# Patient Record
Sex: FEMALE | Race: WHITE
[De-identification: names, ages, dates, MRNs, and addresses within clinical notes are randomized per-mention and may not be internally consistent; named-entity substitution may affect disease eponyms.]

---

## 2019-07-27 ENCOUNTER — HOSPITAL ENCOUNTER (OUTPATIENT)
Age: 69
End: 2019-07-27
Payer: COMMERCIAL

## 2019-07-27 DIAGNOSIS — R73.09: ICD-10-CM

## 2019-07-27 DIAGNOSIS — E55.9: ICD-10-CM

## 2019-07-27 DIAGNOSIS — I10: ICD-10-CM

## 2019-07-27 DIAGNOSIS — E78.00: ICD-10-CM

## 2019-07-27 DIAGNOSIS — Z00.00: Primary | ICD-10-CM

## 2019-07-27 LAB
ALANINE AMINOTRANSFER ALT/SGPT: 35 U/L (ref 13–56)
ALBUMIN SERPL-MCNC: 3.9 G/DL (ref 3.2–5)
ALKALINE PHOSPHATASE: 73 U/L (ref 45–117)
ANION GAP: 8 (ref 5–15)
AST(SGOT): 29 U/L (ref 15–37)
BUN SERPL-MCNC: 21 MG/DL (ref 7–18)
BUN/CREAT RATIO: 25.2 RATIO (ref 10–20)
CALCIUM SERPL-MCNC: 8.8 MG/DL (ref 8.5–10.1)
CARBON DIOXIDE: 25 MMOL/L (ref 21–32)
CHLORIDE: 106 MMOL/L (ref 98–107)
CHOLEST SERPL-MCNC: 146 MG/DL
EST GLOM FILT RATE - AFR AMER: 87 ML/MIN (ref 60–?)
GLOBULIN: 3.9 G/DL (ref 2.2–4.2)
GLUCOSE: 96 MG/DL (ref 74–106)
MAGNESIUM: 1.9 MG/DL (ref 1.6–2.6)
POTASSIUM: 3.5 MMOL/L (ref 3.5–5.1)
TRIGLYCERIDES: 139 MG/DL
VLDLC SERPL-MCNC: 28 MG/DL (ref 5–40)

## 2019-07-27 PROCEDURE — 83036 HEMOGLOBIN GLYCOSYLATED A1C: CPT

## 2019-07-27 PROCEDURE — 83735 ASSAY OF MAGNESIUM: CPT

## 2019-07-27 PROCEDURE — 82306 VITAMIN D 25 HYDROXY: CPT

## 2019-07-27 PROCEDURE — 80061 LIPID PANEL: CPT

## 2019-07-27 PROCEDURE — 36415 COLL VENOUS BLD VENIPUNCTURE: CPT

## 2019-07-27 PROCEDURE — 84443 ASSAY THYROID STIM HORMONE: CPT

## 2019-07-27 PROCEDURE — 80053 COMPREHEN METABOLIC PANEL: CPT

## 2019-07-29 LAB — VITAMIN D,25 HYDROXY: 32.7 NG/ML (ref 29.95–100.01)

## 2023-12-27 ENCOUNTER — EVALUATION (OUTPATIENT)
Dept: PHYSICAL THERAPY | Facility: CLINIC | Age: 73
End: 2023-12-27
Payer: MEDICARE

## 2023-12-27 DIAGNOSIS — Z98.890 S/P LEFT ROTATOR CUFF REPAIR: ICD-10-CM

## 2023-12-27 DIAGNOSIS — M25.512 LEFT SHOULDER PAIN: Primary | ICD-10-CM

## 2023-12-27 PROCEDURE — 97161 PT EVAL LOW COMPLEX 20 MIN: CPT | Mod: GP

## 2023-12-27 ASSESSMENT — ENCOUNTER SYMPTOMS
DEPRESSION: 0
LOSS OF SENSATION IN FEET: 0
OCCASIONAL FEELINGS OF UNSTEADINESS: 0

## 2023-12-27 ASSESSMENT — ACTIVITIES OF DAILY LIVING (ADL): EFFECT OF PAIN ON DAILY ACTIVITIES: SEE GOALS

## 2023-12-27 ASSESSMENT — PAIN - FUNCTIONAL ASSESSMENT: PAIN_FUNCTIONAL_ASSESSMENT: 0-10

## 2023-12-27 ASSESSMENT — PAIN SCALES - GENERAL: PAINLEVEL_OUTOF10: 8

## 2023-12-27 NOTE — Clinical Note
December 27, 2023    Yung Hylton MD  [address] [address] [place] [address]    Patient: Apolonia Badillo   YOB: 1950   Date of Visit: 12/27/2023       Dear Yung Hylton Md  [address] [facility] [address] [address]    The attached plan of care is being sent to you because your patient’s medical reimbursement requires that you certify the plan of care. Your signature is required to allow uninterrupted insurance coverage.      You may indicate your approval by signing below and faxing this form back to us at Dept Fax: [phone].    Please call Dept: [phone] with any questions or concerns.    Thank you for this referral,        Shahbaz Feliciano, PT  [address] [address]    Payer: Payor: [employer] / Plan: [facility] MEDICARE ADVANTAGE / Product Type: *No Product type* /                                                                         Date:     Dear Shahbaz Feliciano PT,     Re: Ms. Apolonia Badillo, MRN:[id number]    I certify that I have reviewed the attached plan of care and it is medically necessary for Ms. Apolonia Badillo (1950) who is under my care.          ______________________________________                    _________________  Provider name and credentials                                           Date and time                                                                                           Plan of Care 12/27/23   Effective from: 12/27/2023  Effective to: 3/26/2024    Plan ID: [id number]            Participants as of Finalize on 12/27/2023    Name Type Comments Contact Info    Yung Hylton MD Referring Provider  [phone]    Shahbaz Feliciano PT Physical Therapist  [phone]       Last Plan Note     Author: Shahbaz Feliciano PT Status: Signed Last edited: 12/27/2023  8:30 AM       Physical Therapy Evaluation and Treatment      Patient Name: Apolonia Badillo  MRN:  [id number]  Today's Date: 12/27/2023  Time Calculation  Start Time: 0830  Stop Time: 0915  Time Calculation (min): 45 min      Assessment:  Rehab Prognosis: Good  S/P L RTCR/bicpes tenodesis  The physical therapist of record is the therapist who assumes primary responsibility for patient management and as such is held accountable for the coordination, continuation, and progression of the POC.  This patient's care and PT of record will be transferred from Shahbaz Feliciano PT to  DEDRICK ALFONSOT effective as of  12/27/23.  There is a 35-40$ copay.  Plan:  Treatment/Interventions: Cryotherapy, Education/ Instruction, Electrical stimulation, Hot pack, Manual therapy, Self care/ home management, Therapeutic exercises (IASTM/cupping)  PT Plan: Skilled PT  PT Frequency: 2 times per week  Duration: 6wks  Onset Date: 12/21/23  Certification Period Start Date: 12/27/23  Certification Period End Date: 03/26/24  Rehab Potential: Good  Plan of Care Agreement: Patient    Current Problem:   1. Left shoulder pain        2. S/P left rotator cuff repair  Referral to Physical Therapy          Subjective    General:  General  Reason for Referral: S/P L shldr RTCR/biceps tenodesis (scope)  Referred By: Warner  Precautions:  Precautions  STEADI Fall Risk Score (The score of 4 or more indicates an increased risk of falling): 0  Braces Applied: L post surg pillow/s;ing worn to eval  Precautions Comment: rehab to [facility] in chart    Pain:  Pain Assessment  Pain Assessment: 0-10  Pain Score: 8  Pain Location: Shoulder  Pain Orientation: Left  Effect of Pain on Daily Activities: see goals    Prior Level of Function:  Prior Function Per Pt/Caregiver Report  Vocational:  (off work-factory/assembly)  Hand Dominance: Left    Objective     Outcome Measures:  Other Measures  Other Outcome Measures: 75% QDI     Treatments:eval only-time  Continue with L shldr S/P surgical rehab per protocol (the patient agreed to confirm whether or not there was  "subscapularis repair done with the surgery)    OP EDUCATION:  Outpatient Education  Individual(s) Educated: Patient  Patient/Caregiver Demonstrated Understanding: yes  Plan of Care Discussed and Agreed Upon: yes  Discussed scheduling with 35-40$ copay  Goals:  Active       PT Problem       PT Goal 1       Start:  12/27/23    Expected End:  03/26/24       1. Independent HEP to allow for 50% reduction in max ADL C/C sx ( 8/10) 3-4wks  2. 0/10 night time sx to allow for uninterrupted sleep x 1wk ( 7/7) 3-4wks  3. Survey score improvement from 75% to 50% (QDI) 4-6wks  4. ROM increase to allow for ADL reaching (per Dr protocol) 4-6wks   5. Strength increase to allow for improved ADL object handling (per Dr protocol) 4-6wks          PT Goal 2       Start:  12/27/23    Expected End:  03/26/24       1. \"I want to get back to [employer] and work\"             Shoulder        Shoulder Observation  Shoulder Observation Comment: eneterd with L post surg pillow/sling in place        Shoulder AROM WFL unless documented below  R Shoulder flexion: (180°): 140  L Shoulder flexion: (180°): NT (per protocol)  Shoulder PROM WFL unless documented below  R shoulder flexion: (180°): 165  L shoulder flexion: (180°): 90  R shoulder ER: (90°): 90  L shoulder ER: (90°): 20  R shoulder IR: (70°): 90    Shoulder Strength WFL unless documented below  R shoulder abduction: (5/5): 5/5  L shoulder abduction: (5/5): NT  R shoulder ER: (5/5): 5/5  L shoulder ER: (5/5): NT  R shoulder IR: (5/5) : 5/5  L shoulder IR: (5/5): NT  R elb flex/ext 5/5; L NT         Current Participants as of 12/27/2023    Name Type Comments Contact Info    Yung Hylton MD Referring Provider  [phone]    Signature pending    Shahbaz Feliciano, PT Physical Therapist  [phone]          "

## 2023-12-27 NOTE — PROGRESS NOTES
Physical Therapy Evaluation and Treatment      Patient Name: Apolonia Badillo  MRN: [id number]  Today's Date: 12/27/2023  Time Calculation  Start Time: 0830  Stop Time: 0915  Time Calculation (min): 45 min      Assessment:  Rehab Prognosis: Good  S/P L RTCR/bicpes tenodesis  The physical therapist of record is the therapist who assumes primary responsibility for patient management and as such is held accountable for the coordination, continuation, and progression of the POC.  This patient's care and PT of record will be transferred from Shahbaz Feliciano PT to  DEDRICK ALFONSOT effective as of  12/27/23.  There is a 35-40$ copay.  Plan:  Treatment/Interventions: Cryotherapy, Education/ Instruction, Electrical stimulation, Hot pack, Manual therapy, Self care/ home management, Therapeutic exercises (IASTM/cupping)  PT Plan: Skilled PT  PT Frequency: 2 times per week  Duration: 6wks  Onset Date: 12/21/23  Certification Period Start Date: 12/27/23  Certification Period End Date: 03/26/24  Rehab Potential: Good  Plan of Care Agreement: Patient    Current Problem:   1. Left shoulder pain        2. S/P left rotator cuff repair  Referral to Physical Therapy          Subjective    General:  General  Reason for Referral: S/P L shldr RTCR/biceps tenodesis (scope)  Referred By: Warner  Precautions:  Precautions  STEADI Fall Risk Score (The score of 4 or more indicates an increased risk of falling): 0  Braces Applied: L post surg pillow/s;ing worn to eval  Precautions Comment: rehab to [facility] in chart    Pain:  Pain Assessment  Pain Assessment: 0-10  Pain Score: 8  Pain Location: Shoulder  Pain Orientation: Left  Effect of Pain on Daily Activities: see goals    Prior Level of Function:  Prior Function Per Pt/Caregiver Report  Vocational:  (off work-factory/assembly)  Hand Dominance: Left    Objective     Outcome Measures:  Other Measures  Other Outcome Measures: 75% QDI     Treatments:eval only-time  Continue with L shldr S/P  "surgical rehab per protocol (the patient agreed to confirm whether or not there was subscapularis repair done with the surgery-per patient VMM the subscapularis was not repaired during surgery so subscap protocol limitations do not apply)    OP EDUCATION:  Outpatient Education  Individual(s) Educated: Patient  Patient/Caregiver Demonstrated Understanding: yes  Plan of Care Discussed and Agreed Upon: yes  Discussed scheduling with 35-40$ copay  Goals:  Active       PT Problem       PT Goal 1       Start:  12/27/23    Expected End:  03/26/24       1. Independent HEP to allow for 50% reduction in max ADL C/C sx ( 8/10) 3-4wks  2. 0/10 night time sx to allow for uninterrupted sleep x 1wk ( 7/7) 3-4wks  3. Survey score improvement from 75% to 50% (QDI) 4-6wks  4. ROM increase to allow for ADL reaching (per Dr protocol) 4-6wks   5. Strength increase to allow for improved ADL object handling (per Dr protocol) 4-6wks          PT Goal 2       Start:  12/27/23    Expected End:  03/26/24       1. \"I want to get back to [employer] and work\"             Shoulder        Shoulder Observation  Shoulder Observation Comment: eneterd with L post surg pillow/sling in place        Shoulder AROM WFL unless documented below  R Shoulder flexion: (180°): 140  L Shoulder flexion: (180°): NT (per protocol)  Shoulder PROM WFL unless documented below  R shoulder flexion: (180°): 165  L shoulder flexion: (180°): 90  R shoulder ER: (90°): 90  L shoulder ER: (90°): 20  R shoulder IR: (70°): 90    Shoulder Strength WFL unless documented below  R shoulder abduction: (5/5): 5/5  L shoulder abduction: (5/5): NT  R shoulder ER: (5/5): 5/5  L shoulder ER: (5/5): NT  R shoulder IR: (5/5) : 5/5  L shoulder IR: (5/5): NT  R elb flex/ext 5/5; L NT  "

## 2024-01-15 ENCOUNTER — TREATMENT (OUTPATIENT)
Dept: PHYSICAL THERAPY | Facility: CLINIC | Age: 74
End: 2024-01-15
Payer: MEDICARE

## 2024-01-15 DIAGNOSIS — Z98.890 S/P LEFT ROTATOR CUFF REPAIR: ICD-10-CM

## 2024-01-15 DIAGNOSIS — M25.512 LEFT SHOULDER PAIN: ICD-10-CM

## 2024-01-15 PROCEDURE — 97110 THERAPEUTIC EXERCISES: CPT | Mod: GP,CQ

## 2024-01-15 PROCEDURE — 97140 MANUAL THERAPY 1/> REGIONS: CPT | Mod: GP,CQ

## 2024-01-15 ASSESSMENT — PAIN - FUNCTIONAL ASSESSMENT: PAIN_FUNCTIONAL_ASSESSMENT: 0-10

## 2024-01-15 ASSESSMENT — PAIN DESCRIPTION - DESCRIPTORS: DESCRIPTORS: DULL;ACHING

## 2024-01-15 ASSESSMENT — PAIN SCALES - GENERAL: PAINLEVEL_OUTOF10: 3

## 2024-01-15 NOTE — PROGRESS NOTES
"Physical Therapy Treatment    Patient Name: Apolonia Badillo  MRN: [id number]  Today's Date: 1/15/2024  Time Calculation  Start Time: 1446  Stop Time: 1530  Time Calculation (min): 44 min      Assessment:Patient identified by name and date of birth.   Added scap retractions and shoulder rolls this date.   Mild end range tightness passively with flexion.   Added new exercises to HEP with correct technique demonstrated and patient verbalizing understanding of instruction. (see below)         Plan:  Plan to follow MD protocol for progression. JA    OP PT Plan  Treatment/Interventions: Cryotherapy, Education/ Instruction, Electrical stimulation, Hot pack, Manual therapy, Self care/ home management, Therapeutic exercises (IASTM/cupping)  PT Plan: Skilled PT  PT Frequency: 2 times per week  Duration: 6wks  Onset Date: 12/21/23  Certification Period Start Date: 12/27/23  Certification Period End Date: 03/26/24  Rehab Potential: Good  Plan of Care Agreement: Patient    Current Problem  Problem List Items Addressed This Visit             ICD-10-CM    Left shoulder pain M25.512     Other Visit Diagnoses         Codes    S/P left rotator cuff repair     Z98.890            Subjective  No HEP  given at eval. Patient reports has F/U with MD on 2/05/24.     Precautions  Precautions  Braces Applied: L post surg pillows worn to eval  Precautions Comment: rehab to [facility] in chart    Pain  Pain Assessment: 0-10  Pain Score: 3  Pain Location: Shoulder (shoulder and upper arm)  Pain Orientation: Left  Pain Descriptors: Dull, Aching  Pain Frequency: Intermittent    Treatments:  Continue with L shldr S/P surgical rehab per protocol (the patient agreed to confirm whether or not there was subscapularis repair done with the surgery-per patient [facility] the subscapularis was not repaired during surgery so subscap protocol limitations do not apply)    Therex  Scap retractions 2 x 10 3\" hold (N)   Shoulder rolls Fwd/Bwd 2 x 10 (N)   Pendulums x " "10 (N)     Manual: PROM all planes following protocol guidelines.     OP EDUCATION:  1/15/24: Added shoulder rolls and scap retractions but patient declined handout.      Outpatient Education  Individual(s) Educated: Patient  Patient/Caregiver Demonstrated Understanding: yes  Plan of Care Discussed and Agreed Upon: yes  Discussed scheduling with 35-40$ copay    Goals:  Active       PT Problem       PT Goal 1       Start:  12/27/23    Expected End:  03/26/24       1. Independent HEP to allow for 50% reduction in max ADL C/C sx ( 8/10) 3-4wks  2. 0/10 night time sx to allow for uninterrupted sleep x 1wk ( 7/7) 3-4wks  3. Survey score improvement from 75% to 50% (QDI) 4-6wks  4. ROM increase to allow for ADL reaching (per Dr protocol) 4-6wks   5. Strength increase to allow for improved ADL object handling (per Dr protocol) 4-6wks          PT Goal 2       Start:  12/27/23    Expected End:  03/26/24       1. \"I want to get back to [employer] and work\"            "

## 2024-01-17 ENCOUNTER — TREATMENT (OUTPATIENT)
Dept: PHYSICAL THERAPY | Facility: CLINIC | Age: 74
End: 2024-01-17
Payer: MEDICARE

## 2024-01-17 DIAGNOSIS — M25.512 LEFT SHOULDER PAIN: ICD-10-CM

## 2024-01-17 DIAGNOSIS — Z98.890 S/P LEFT ROTATOR CUFF REPAIR: ICD-10-CM

## 2024-01-17 PROCEDURE — 97140 MANUAL THERAPY 1/> REGIONS: CPT | Mod: GP,CQ

## 2024-01-17 PROCEDURE — 97110 THERAPEUTIC EXERCISES: CPT | Mod: GP,CQ

## 2024-01-17 ASSESSMENT — PAIN - FUNCTIONAL ASSESSMENT: PAIN_FUNCTIONAL_ASSESSMENT: 0-10

## 2024-01-17 ASSESSMENT — PAIN DESCRIPTION - DESCRIPTORS: DESCRIPTORS: DULL;ACHING;TENDER

## 2024-01-17 NOTE — PROGRESS NOTES
Physical Therapy Treatment    Patient Name: Apolonia Badillo  MRN: [id number]  Today's Date: 1/17/2024  Time Calculation  Start Time: 1448  Stop Time: 1526  Time Calculation (min): 38 min      Assessment:Patient identified by name and date of birth.   Observed patient actively moving the shoulder after taking brace off. Reminded patient she is not supposed to be moving the arm actively at this point per protocol.  Tightness in bicep muscle belly this date, completed gentle STW to the area with relief noted by patient. Area was tender to touch.   Did not observe any swelling in the L arm.   Discussed using ice at home for pain PRN.   Completed PROM per protocol. Mild pain passively at 90 degrees when lowering back down from passive flexion at 110 degrees. Completed very gentle manual distraction at 90 degrees to overcome pain with relief noted by patient.   Observed patient using the L shoulder to push herself up from supine to sitting position.   No new HEP given this date.          Plan:  Progress as allowed per per MD protocol. JA    OP PT Plan  Treatment/Interventions: Cryotherapy, Education/ Instruction, Electrical stimulation, Hot pack, Manual therapy, Self care/ home management, Therapeutic exercises (IASTM/cupping)  PT Plan: Skilled PT  PT Frequency: 2 times per week  Duration: 6wks  Onset Date: 12/21/23  Certification Period Start Date: 12/27/23  Certification Period End Date: 03/26/24  Rehab Potential: Good  Plan of Care Agreement: Patient    Current Problem  Problem List Items Addressed This Visit             ICD-10-CM    Left shoulder pain M25.512     Other Visit Diagnoses         Codes    S/P left rotator cuff repair     Z98.890            Subjective Patient understands and is compliant with HEP completion with no issues to report at this time. Patient states that her bicep has been sore since last visit. Has not taken any pain meds or used any ice recently.  Notes that she had pain pump when coming  "home from surgery through [place]. Notes that she is going to try to take something for pain tonight. Patient reports that she woke up on her stomach while wearing her brace.       Precautions  Precautions  Braces Applied: L post surg pillows worn to eval  Precautions Comment: rehab to [facility] in chart    Pain  Pain Assessment: 0-10  Pain Location:  (Shoulder/upper arm/ bicep area.)  Pain Orientation: Left  Pain Descriptors: Dull, Aching, Tender  Pain Frequency: Intermittent    Treatments:  Continue with L shldr S/P surgical rehab per protocol (the patient agreed to confirm whether or not there was subscapularis repair done with the surgery-per patient VMM the subscapularis was not repaired during surgery so subscap protocol limitations do not apply)     Therex    Seated elbow flexion 2 x 10 (N)  Scap retractions 2 x 10 3\" hold   Shoulder rolls Fwd/Bwd 2 x 10   Pendulums x 10      Manual:   PROM all planes following protocol guidelines.   STW to bicep muscle belly (N)     OP EDUCATION:   1/15/24: Added shoulder rolls and scap retractions but patient declined handout.       Outpatient Education  Individual(s) Educated: Patient  Patient/Caregiver Demonstrated Understanding: yes  Plan of Care Discussed and Agreed Upon: yes  Discussed scheduling with 35-40$ copay    Goals:  Active       PT Problem       PT Goal 1       Start:  12/27/23    Expected End:  03/26/24       1. Independent HEP to allow for 50% reduction in max ADL C/C sx ( 8/10) 3-4wks  2. 0/10 night time sx to allow for uninterrupted sleep x 1wk ( 7/7) 3-4wks  3. Survey score improvement from 75% to 50% (QDI) 4-6wks  4. ROM increase to allow for ADL reaching (per Dr protocol) 4-6wks   5. Strength increase to allow for improved ADL object handling (per Dr protocol) 4-6wks          PT Goal 2       Start:  12/27/23    Expected End:  03/26/24       1. \"I want to get back to [employer] and work\"            "

## 2024-01-22 ENCOUNTER — TREATMENT (OUTPATIENT)
Dept: PHYSICAL THERAPY | Facility: CLINIC | Age: 74
End: 2024-01-22
Payer: MEDICARE

## 2024-01-22 DIAGNOSIS — M25.512 LEFT SHOULDER PAIN: ICD-10-CM

## 2024-01-22 DIAGNOSIS — Z98.890 S/P LEFT ROTATOR CUFF REPAIR: ICD-10-CM

## 2024-01-22 PROCEDURE — 97140 MANUAL THERAPY 1/> REGIONS: CPT | Mod: GP,CQ

## 2024-01-22 PROCEDURE — 97110 THERAPEUTIC EXERCISES: CPT | Mod: GP,CQ

## 2024-01-22 ASSESSMENT — PAIN SCALES - GENERAL: PAINLEVEL_OUTOF10: 0 - NO PAIN

## 2024-01-22 ASSESSMENT — PAIN - FUNCTIONAL ASSESSMENT: PAIN_FUNCTIONAL_ASSESSMENT: 0-10

## 2024-01-22 ASSESSMENT — PAIN DESCRIPTION - DESCRIPTORS: DESCRIPTORS: TENDER;SHARP

## 2024-01-22 NOTE — PROGRESS NOTES
Physical Therapy Treatment    Patient Name: Apolonia Badillo  MRN: [id number]  Today's Date: 1/22/2024  Time Calculation  Start Time: 1446  Stop Time: 1525  Time Calculation (min): 39 min      Assessment:Patient identified by name and date of birth.   PT consulted with patient and examined shoulder d/t continued pain level. PT and PTA observed patient actively moving the arm around when donning/doffing sling. PT and PTA educated importance of following protocol to limit active movement. Patient verbalized her understanding. Focused on PROM this date per protocol limits with continued mild pain at 90 degrees. Gentle shoulder distraction throughout the range continues to give relief at the 90 degree point.            Plan:  Plan to continue with progression as allowed per current protocol. JA    OP PT Plan  Treatment/Interventions: Cryotherapy, Education/ Instruction, Electrical stimulation, Hot pack, Manual therapy, Self care/ home management, Therapeutic exercises (IASTM/cupping)  PT Plan: Skilled PT  PT Frequency: 2 times per week  Duration: 6wks  Onset Date: 12/21/23  Certification Period Start Date: 12/27/23  Certification Period End Date: 03/26/24  Rehab Potential: Good  Plan of Care Agreement: Patient    Current Problem  Problem List Items Addressed This Visit             ICD-10-CM    Left shoulder pain M25.512     Other Visit Diagnoses         Codes    S/P left rotator cuff repair     Z98.890            Subjective Patient is compliant with HEP completion with no issues to report at this time.  Notes that the shoulder pain is lessened somewhat. Notes that the pendulum exercises give her relief.     Precautions  Precautions  Braces Applied: L post surg pillows worn to eval  Precautions Comment: rehab to [facility] in chart    Pain  Pain Assessment: 0-10  Pain Score: 0 - No pain  Pain Location:  (bicep)  Pain Orientation: Left  Pain Descriptors: Tender, Sharp  Pain Frequency: Intermittent    Treatments:  Continue  "with L shldr S/P surgical rehab per protocol (the patient agreed to confirm whether or not there was subscapularis repair done with the surgery-per patient VMM the subscapularis was not repaired during surgery so subscap protocol limitations do not apply).     Therex 8'     Seated elbow flexion 2 x 10 (x)  Scap retractions 2 x 10 3\" hold   Shoulder rolls Fwd/Bwd 2 x 10   Pendulums x 10      Manual: 30'  PROM all planes following protocol guidelines.   STW to bicep muscle belly       OP EDUCATION:   1/15/24: Added shoulder rolls and scap retractions but patient declined handout.       Outpatient Education  Individual(s) Educated: Patient  Patient/Caregiver Demonstrated Understanding: yes  Plan of Care Discussed and Agreed Upon: yes  Discussed scheduling with 35-40$ copay    Goals:  Active       PT Problem       PT Goal 1       Start:  12/27/23    Expected End:  03/26/24       1. Independent HEP to allow for 50% reduction in max ADL C/C sx ( 8/10) 3-4wks  2. 0/10 night time sx to allow for uninterrupted sleep x 1wk ( 7/7) 3-4wks  3. Survey score improvement from 75% to 50% (QDI) 4-6wks  4. ROM increase to allow for ADL reaching (per Dr protocol) 4-6wks   5. Strength increase to allow for improved ADL object handling (per Dr protocol) 4-6wks          PT Goal 2       Start:  12/27/23    Expected End:  03/26/24       1. \"I want to get back to [employer] and work\"            "

## 2024-01-24 ENCOUNTER — APPOINTMENT (OUTPATIENT)
Dept: PHYSICAL THERAPY | Facility: CLINIC | Age: 74
End: 2024-01-24
Payer: MEDICARE

## 2024-01-29 ENCOUNTER — APPOINTMENT (OUTPATIENT)
Dept: PHYSICAL THERAPY | Facility: CLINIC | Age: 74
End: 2024-01-29
Payer: MEDICARE

## 2024-01-31 ENCOUNTER — APPOINTMENT (OUTPATIENT)
Dept: PHYSICAL THERAPY | Facility: CLINIC | Age: 74
End: 2024-01-31
Payer: MEDICARE

## 2024-02-05 ENCOUNTER — APPOINTMENT (OUTPATIENT)
Dept: PHYSICAL THERAPY | Facility: CLINIC | Age: 74
End: 2024-02-05
Payer: MEDICARE

## 2024-02-07 ENCOUNTER — APPOINTMENT (OUTPATIENT)
Dept: PHYSICAL THERAPY | Facility: CLINIC | Age: 74
End: 2024-02-07
Payer: MEDICARE

## 2024-02-12 ENCOUNTER — APPOINTMENT (OUTPATIENT)
Dept: PHYSICAL THERAPY | Facility: CLINIC | Age: 74
End: 2024-02-12
Payer: MEDICARE

## 2024-02-14 ENCOUNTER — APPOINTMENT (OUTPATIENT)
Dept: PHYSICAL THERAPY | Facility: CLINIC | Age: 74
End: 2024-02-14
Payer: MEDICARE

## 2024-02-19 ENCOUNTER — APPOINTMENT (OUTPATIENT)
Dept: PHYSICAL THERAPY | Facility: CLINIC | Age: 74
End: 2024-02-19
Payer: MEDICARE

## 2024-02-21 ENCOUNTER — APPOINTMENT (OUTPATIENT)
Dept: PHYSICAL THERAPY | Facility: CLINIC | Age: 74
End: 2024-02-21
Payer: MEDICARE

## 2024-02-23 ENCOUNTER — APPOINTMENT (OUTPATIENT)
Dept: PHYSICAL THERAPY | Facility: CLINIC | Age: 74
End: 2024-02-23
Payer: MEDICARE

## 2024-02-28 ENCOUNTER — DOCUMENTATION (OUTPATIENT)
Dept: PHYSICAL THERAPY | Facility: CLINIC | Age: 74
End: 2024-02-28
Payer: MEDICARE

## 2024-02-28 NOTE — PROGRESS NOTES
Physical Therapy    Discharge Summary    Name: Apolonia Badillo  MRN: [id number]  : 1950  Date: 24    Discharge Summary: PT    Discharge Information: Date of last visit 2024    Therapy Summary: Patient seen for PT evaluation and 3 follow up session and did not return for further therapy sessions.      Rehab Discharge Reason: Failed to schedule and/or keep follow-up appointment(s)

## 2025-03-11 ENCOUNTER — HOSPITAL ENCOUNTER (OUTPATIENT)
Dept: HOSPITAL 100 - RAD | Age: 75
Discharge: HOME | End: 2025-03-11
Payer: MEDICARE

## 2025-03-11 DIAGNOSIS — M79.18: Primary | ICD-10-CM

## 2025-03-11 PROCEDURE — 72070 X-RAY EXAM THORAC SPINE 2VWS: CPT

## 2025-03-11 PROCEDURE — 72050 X-RAY EXAM NECK SPINE 4/5VWS: CPT

## 2025-03-18 ENCOUNTER — HOSPITAL ENCOUNTER (OUTPATIENT)
Dept: HOSPITAL 100 - RAD | Age: 75
Discharge: HOME | End: 2025-03-18
Payer: MEDICARE

## 2025-03-18 DIAGNOSIS — U07.1: Primary | ICD-10-CM

## 2025-03-18 PROCEDURE — 71046 X-RAY EXAM CHEST 2 VIEWS: CPT

## 2025-03-25 ENCOUNTER — HOSPITAL ENCOUNTER (OUTPATIENT)
Age: 75
Discharge: HOME | End: 2025-03-25
Payer: MEDICARE

## 2025-03-25 DIAGNOSIS — I10: ICD-10-CM

## 2025-03-25 DIAGNOSIS — E04.2: Primary | ICD-10-CM

## 2025-03-25 DIAGNOSIS — F17.200: ICD-10-CM

## 2025-03-25 DIAGNOSIS — R27.8: ICD-10-CM

## 2025-03-25 DIAGNOSIS — Z86.73: ICD-10-CM

## 2025-03-25 DIAGNOSIS — H53.10: ICD-10-CM

## 2025-03-25 DIAGNOSIS — R09.89: ICD-10-CM

## 2025-03-25 PROCEDURE — 93880 EXTRACRANIAL BILAT STUDY: CPT

## 2025-03-26 ENCOUNTER — HOSPITAL ENCOUNTER (OUTPATIENT)
Dept: HOSPITAL 100 - US | Age: 75
Discharge: HOME | End: 2025-03-26
Payer: MEDICARE

## 2025-03-26 DIAGNOSIS — F17.200: ICD-10-CM

## 2025-03-26 DIAGNOSIS — Z86.73: ICD-10-CM

## 2025-03-26 DIAGNOSIS — E04.2: Primary | ICD-10-CM

## 2025-03-26 DIAGNOSIS — H53.10: ICD-10-CM

## 2025-03-26 DIAGNOSIS — I10: ICD-10-CM

## 2025-03-26 DIAGNOSIS — R27.8: ICD-10-CM

## 2025-03-26 DIAGNOSIS — R09.89: ICD-10-CM

## 2025-03-26 PROCEDURE — 76536 US EXAM OF HEAD AND NECK: CPT

## 2025-03-27 ENCOUNTER — HOSPITAL ENCOUNTER (OUTPATIENT)
Dept: HOSPITAL 100 - CT | Age: 75
Discharge: HOME | End: 2025-03-27
Payer: MEDICARE

## 2025-03-27 DIAGNOSIS — R09.89: ICD-10-CM

## 2025-03-27 DIAGNOSIS — E04.2: ICD-10-CM

## 2025-03-27 DIAGNOSIS — I10: ICD-10-CM

## 2025-03-27 DIAGNOSIS — H53.10: ICD-10-CM

## 2025-03-27 DIAGNOSIS — R27.8: Primary | ICD-10-CM

## 2025-03-27 DIAGNOSIS — F17.200: ICD-10-CM

## 2025-03-27 DIAGNOSIS — Z86.73: ICD-10-CM

## 2025-03-27 PROCEDURE — 70470 CT HEAD/BRAIN W/O & W/DYE: CPT
